# Patient Record
(demographics unavailable — no encounter records)

---

## 2025-02-18 NOTE — ASSESSMENT
[FreeTextEntry1] : 1.  German has a longstanding history of GI symptoms primarily characterized by abdominal bloating without much else in the way of pathologic symptoms.  That is her bowel function tends to be regular and normal, more normal now that it has been several years ago, although she does there is pretty consistent relief of the bloating sensation when she does have a good evacuation. 2.  She has not had much of a workup in the past, probably because these symptoms have been quite stable.  In the family history her father who has been a patient of mine has had esophageal reflux and has responded nicely to treatment for that. 3.  As I recall there is no obvious family history of GI cancer or even colon polyps, and even her father who is had several colonoscopies has had primarily negative exams. 4.  Perhaps the most intriguing part of this entire history is the fact that when she has taken the antidiabetic and weight losing drug Mounjaro, she has responded with a almost complete resolution of her symptoms, over a 4-week period of time, occurring very rapidly, and the improvement abated and she had a return of her symptoms almost as soon as she went off the Mounjaro. 5.  Even more striking she was taking a very low and subtherapeutic dose, 1 mg injections per week, when the normal treatment level is 2.5.-15 mgper week 6.anbnl stool test by the [facility] techniqque of analysis, with several counts out of the [place], and th eresult that she has a Dysbiotic pattern of stool bacterium, and the microbiome. 7.  she will go for blood testing 8.  no recent gyn exam, so i am advising that and a pelvic ultrasound.  working diagnosis;  IBS, C might end up trying gluten free lactose free probiotic will do fecal calprot  dont think colonoscopy will help  the relationship and improvement with TIrzepatide is very interesting

## 2025-02-18 NOTE — HISTORY OF PRESENT ILLNESS
[FreeTextEntry1] : patient is here for initial visit. She is a healthy patient, aged 38, with a longstanding history of abdominal bloating. this has been occurring since young adulthood or even longer she is in excellent medical health she did go through a period of Bulimic symptoms characterized by purging.  this occurred over several years, but has been largely resolved since she had her two children aged four and five and a half since that time, she has been very active exercies regularly has been fit from [facility].  approx several months earlier, she took four weeks of the GLP-1 GIP receptor agonist otherwise known as Tirzepatide;  while she took this at low or sub therapeutic dose, one ml per week SQ, where the usual dose is 2.5 up to 12.5 or more.  her abdominal bloating resolved completely during that time  the symptoms did return soon after she stopped this medication.  the abdominal bloating can be associated with reflux she has been having more normal bowel movements even regular over the last months, yet with a bowel movement there can be some relief of her symptoms  she does have the bloating even without eating, but the bloating can worsten after she eats no particular food makes her feel worse the patient also has no rectal bleed no fh of colon cancer or gi cancer  SHe had done a stool tenst called commercially a Rashad test, with a number of findings of "dysbiosis" the consequence of which I cannot state with certainty

## 2025-02-18 NOTE — PHYSICAL EXAM
[Normal] : alert, normal voice/communication, healthy appearing, no acute distress [Abdomen Tenderness] : non-tender [No Masses] : no abdominal mass palpated [Normal Sphincter Tone] : normal sphincter tone [No External Hemorrhoid] : no external hemorrhoids [No Rectal Mass] : no rectal mass [Occult Blood] : negative occult blood [Chaperone Present: ____] : chaperone present: [unfilled] [de-identified] : very fit and healthy appearing patient [de-identified] : abdominoplasty incision [de-identified] : stool soft, sent for FIT